# Patient Record
Sex: MALE | Race: WHITE | NOT HISPANIC OR LATINO
[De-identification: names, ages, dates, MRNs, and addresses within clinical notes are randomized per-mention and may not be internally consistent; named-entity substitution may affect disease eponyms.]

---

## 2017-08-08 ENCOUNTER — NON-APPOINTMENT (OUTPATIENT)
Age: 52
End: 2017-08-08

## 2017-08-08 ENCOUNTER — APPOINTMENT (OUTPATIENT)
Dept: ELECTROPHYSIOLOGY | Facility: CLINIC | Age: 52
End: 2017-08-08
Payer: COMMERCIAL

## 2017-08-08 VITALS — DIASTOLIC BLOOD PRESSURE: 79 MMHG | HEART RATE: 57 BPM | SYSTOLIC BLOOD PRESSURE: 121 MMHG | OXYGEN SATURATION: 96 %

## 2017-08-08 DIAGNOSIS — R07.89 OTHER CHEST PAIN: ICD-10-CM

## 2017-08-08 PROCEDURE — 99214 OFFICE O/P EST MOD 30 MIN: CPT

## 2017-08-08 PROCEDURE — 93000 ELECTROCARDIOGRAM COMPLETE: CPT

## 2017-08-15 ENCOUNTER — APPOINTMENT (OUTPATIENT)
Dept: CV DIAGNOSITCS | Facility: HOSPITAL | Age: 52
End: 2017-08-15

## 2017-08-15 ENCOUNTER — OUTPATIENT (OUTPATIENT)
Dept: OUTPATIENT SERVICES | Facility: HOSPITAL | Age: 52
LOS: 1 days | End: 2017-08-15
Payer: COMMERCIAL

## 2017-08-15 DIAGNOSIS — R07.89 OTHER CHEST PAIN: ICD-10-CM

## 2017-08-15 PROCEDURE — 93320 DOPPLER ECHO COMPLETE: CPT | Mod: 26,GC

## 2017-08-15 PROCEDURE — 93325 DOPPLER ECHO COLOR FLOW MAPG: CPT | Mod: 26,GC

## 2017-08-15 PROCEDURE — 93018 CV STRESS TEST I&R ONLY: CPT

## 2017-08-15 PROCEDURE — 93016 CV STRESS TEST SUPVJ ONLY: CPT

## 2017-08-15 PROCEDURE — 93350 STRESS TTE ONLY: CPT | Mod: 26

## 2022-09-06 ENCOUNTER — NON-APPOINTMENT (OUTPATIENT)
Age: 57
End: 2022-09-06

## 2022-09-06 ENCOUNTER — APPOINTMENT (OUTPATIENT)
Dept: ELECTROPHYSIOLOGY | Facility: CLINIC | Age: 57
End: 2022-09-06

## 2022-09-06 VITALS
OXYGEN SATURATION: 97 % | SYSTOLIC BLOOD PRESSURE: 125 MMHG | DIASTOLIC BLOOD PRESSURE: 80 MMHG | HEART RATE: 60 BPM | WEIGHT: 172 LBS | HEIGHT: 70 IN | BODY MASS INDEX: 24.62 KG/M2

## 2022-09-06 DIAGNOSIS — I47.2 VENTRICULAR TACHYCARDIA: ICD-10-CM

## 2022-09-06 PROCEDURE — 93000 ELECTROCARDIOGRAM COMPLETE: CPT

## 2022-09-06 PROCEDURE — 99213 OFFICE O/P EST LOW 20 MIN: CPT | Mod: 25

## 2022-09-06 NOTE — DISCUSSION/SUMMARY
[FreeTextEntry1] : Impression:\par \par 1. Palpitations: EKG performed today to assess for presence of conduction disease and reveals NSR. Brief palpitations. Consider CardioNet to assess for recurrent tachyarrhythmias. Patient declined.  Offered other options: [employer], [employer] Watch, [email or web address]. Patient taught vagal maneuvers, call 911 in the event palpitations do not stop.  \par \par 2. NSVT: Hx of NSVT and near syncope, normal LVEF, normal LHC. EP study non inducible for VT. Off beta blockers given bradycardia at baseline. \par \par Sincerely,\par \par Albert Gibson MD [EKG obtained to assist in diagnosis and management of assessed problem(s)] : EKG obtained to assist in diagnosis and management of assessed problem(s)

## 2022-09-06 NOTE — CARDIOLOGY SUMMARY
[de-identified] : 815/2017: no ischemia  [de-identified] : 8/15/2017: LVEF 65% \par Echocardiographic Study: \par (A) Baseline echocardiogram reveals: \par 1. Normal mitral valve. Minimal mitral regurgitation.\par 2. Normal trileaflet aortic valve. Mild aortic\par regurgitation. \par 3. Normal left ventricular internal dimensions and wall\par thicknesses.\par 4. Normal left ventricular systolic function. No segmental\par wall motion abnormalities.\par 5. Normal right ventricular size and function.\par (B) Stress echocardiogram reveals: \par Normal augmentation in left ventricular systolic function.\par ------------------------------------------------------------------------\par Conclusions: \par 1. Normal hemodynamic response.\par 2. Normal electrocardiographic response.\par 3. Normal augmentation in left ventricular systolic\par function.\par 4. Appropriate heart rate response.\par 5. Appropriate blood pressure response.\par 6. Normal stress echocardiogram with no evidence of\par inducible ischemia. [de-identified] : 3/8/2012: normal coronaries

## 2022-09-06 NOTE — HISTORY OF PRESENT ILLNESS
[FreeTextEntry1] : Yaya Garces is a 56y/o man with Hx of NSVT (EP study 2012, non inducible) who presents today for initial evaluation. Last seen in 2017 and at time had normal ECHO and stress test. Since then, doing well but recently (last week) had an episode of palpitations which lasted up to 30 sec in duration and made him very nervous. Denies anything new that day, was standing waiting for the elevator when it occurred. No other associated symptoms. Denies feeling dizzy or lightheaded. Admits he typically feels occasional extra beats but no sustained symptoms. Typically symptoms when at rest. Not on medications. Denies chest pain,  SOB, syncope or near syncope. Very active, exercising daily. Has a Half [employer] this upcoming weekend. \par

## 2022-12-27 ENCOUNTER — NON-APPOINTMENT (OUTPATIENT)
Age: 57
End: 2022-12-27

## 2022-12-27 ENCOUNTER — APPOINTMENT (OUTPATIENT)
Dept: ELECTROPHYSIOLOGY | Facility: CLINIC | Age: 57
End: 2022-12-27

## 2022-12-27 VITALS
HEART RATE: 70 BPM | BODY MASS INDEX: 25.77 KG/M2 | OXYGEN SATURATION: 99 % | DIASTOLIC BLOOD PRESSURE: 91 MMHG | WEIGHT: 180 LBS | HEIGHT: 70 IN | SYSTOLIC BLOOD PRESSURE: 138 MMHG

## 2022-12-27 PROCEDURE — 93000 ELECTROCARDIOGRAM COMPLETE: CPT

## 2022-12-27 PROCEDURE — 99213 OFFICE O/P EST LOW 20 MIN: CPT | Mod: 25

## 2022-12-27 NOTE — HISTORY OF PRESENT ILLNESS
[FreeTextEntry1] : Yaya Garces is a 58y/o man with Hx of NSVT (EP study 2012, non inducible) who is here today because of recent chest discomfort. Last seen 9/6/22 because of an episode of palpitations which lasted up to 30 sec in duration and made him very nervous. Denied anything new that day, was standing waiting for the elevator when it occurred. No other associated symptoms. Denies feeling dizzy or lightheaded. Admits he typically feels occasional extra beats but no sustained symptoms. Typically symptoms when at rest. Not on medications. Denied chest pain, SOB, syncope or near syncope. Very active, exercising daily. Ran a Half [employer] in September. \par \par Patient had chest discomfort about 3 weeks ago associated with diaphoresis and told his wife weeks later and patient returned here for f/u.  Chest discomfort was pressure about 6/10, but he was able to fall asleep and in the morning the discomfort was gone.  He has no other associated symptoms He has not had dyspnea, palpitations, near syncope or syncope.  The episode of chest discomfort was after drinking excess alcohol.  He has been biking and jogging since the event, up to 5 miles without symptoms.

## 2022-12-27 NOTE — DISCUSSION/SUMMARY
[EKG obtained to assist in diagnosis and management of assessed problem(s)] : EKG obtained to assist in diagnosis and management of assessed problem(s) [FreeTextEntry1] : 1] chest discomfort: ECG unchanged.  Recommend stress echocardiogram since no work up since 2017,  blood work by PCP reportedly normal. Patient advised ER with that type of chest discomfort in the future.  He should also curtail amount of alcohol he drinks.

## 2023-01-03 ENCOUNTER — APPOINTMENT (OUTPATIENT)
Dept: CV DIAGNOSITCS | Facility: HOSPITAL | Age: 58
End: 2023-01-03

## 2023-01-03 ENCOUNTER — OUTPATIENT (OUTPATIENT)
Dept: OUTPATIENT SERVICES | Facility: HOSPITAL | Age: 58
LOS: 1 days | End: 2023-01-03
Payer: COMMERCIAL

## 2023-01-03 DIAGNOSIS — J34.9 UNSPECIFIED DISORDER OF NOSE AND NASAL SINUSES: ICD-10-CM

## 2023-01-03 DIAGNOSIS — R07.89 OTHER CHEST PAIN: ICD-10-CM

## 2023-01-03 PROCEDURE — 93320 DOPPLER ECHO COMPLETE: CPT | Mod: 26,GC

## 2023-01-03 PROCEDURE — 93325 DOPPLER ECHO COLOR FLOW MAPG: CPT | Mod: 26,GC

## 2023-01-03 PROCEDURE — 93350 STRESS TTE ONLY: CPT | Mod: 26

## 2023-12-01 ENCOUNTER — NON-APPOINTMENT (OUTPATIENT)
Age: 58
End: 2023-12-01

## 2023-12-01 ENCOUNTER — APPOINTMENT (OUTPATIENT)
Dept: ELECTROPHYSIOLOGY | Facility: CLINIC | Age: 58
End: 2023-12-01
Payer: COMMERCIAL

## 2023-12-01 VITALS
SYSTOLIC BLOOD PRESSURE: 129 MMHG | HEIGHT: 70 IN | HEART RATE: 59 BPM | BODY MASS INDEX: 26.05 KG/M2 | DIASTOLIC BLOOD PRESSURE: 79 MMHG | OXYGEN SATURATION: 98 % | WEIGHT: 182 LBS

## 2023-12-01 VITALS — TEMPERATURE: 98.2 F

## 2023-12-01 PROCEDURE — 99213 OFFICE O/P EST LOW 20 MIN: CPT | Mod: 25

## 2023-12-01 PROCEDURE — 93000 ELECTROCARDIOGRAM COMPLETE: CPT

## 2024-01-05 ENCOUNTER — NON-APPOINTMENT (OUTPATIENT)
Age: 59
End: 2024-01-05

## 2024-01-05 DIAGNOSIS — R00.2 PALPITATIONS: ICD-10-CM

## 2024-11-08 ENCOUNTER — APPOINTMENT (OUTPATIENT)
Dept: ELECTROPHYSIOLOGY | Facility: CLINIC | Age: 59
End: 2024-11-08
Payer: COMMERCIAL

## 2024-11-08 ENCOUNTER — NON-APPOINTMENT (OUTPATIENT)
Age: 59
End: 2024-11-08

## 2024-11-08 VITALS
SYSTOLIC BLOOD PRESSURE: 152 MMHG | DIASTOLIC BLOOD PRESSURE: 91 MMHG | OXYGEN SATURATION: 96 % | WEIGHT: 185 LBS | HEART RATE: 70 BPM | BODY MASS INDEX: 26.48 KG/M2 | HEIGHT: 70 IN

## 2024-11-08 DIAGNOSIS — I47.29 OTHER VENTRICULAR TACHYCARDIA: ICD-10-CM

## 2024-11-08 DIAGNOSIS — I47.10 SUPRAVENTRICULAR TACHYCARDIA, UNSPECIFIED: ICD-10-CM

## 2024-11-08 PROCEDURE — 93000 ELECTROCARDIOGRAM COMPLETE: CPT

## 2024-11-08 PROCEDURE — 99213 OFFICE O/P EST LOW 20 MIN: CPT | Mod: 25

## 2024-12-18 ENCOUNTER — OUTPATIENT (OUTPATIENT)
Dept: OUTPATIENT SERVICES | Facility: HOSPITAL | Age: 59
LOS: 1 days | End: 2024-12-18
Payer: COMMERCIAL

## 2024-12-18 ENCOUNTER — APPOINTMENT (OUTPATIENT)
Dept: CV DIAGNOSITCS | Facility: HOSPITAL | Age: 59
End: 2024-12-18

## 2024-12-18 ENCOUNTER — RESULT REVIEW (OUTPATIENT)
Age: 59
End: 2024-12-18

## 2024-12-18 DIAGNOSIS — I47.29 OTHER VENTRICULAR TACHYCARDIA: ICD-10-CM

## 2024-12-18 PROCEDURE — 93306 TTE W/DOPPLER COMPLETE: CPT | Mod: 26

## 2025-01-07 DIAGNOSIS — R07.9 CHEST PAIN, UNSPECIFIED: ICD-10-CM

## 2025-04-25 ENCOUNTER — APPOINTMENT (OUTPATIENT)
Dept: CV DIAGNOSTICS | Facility: HOSPITAL | Age: 60
End: 2025-04-25

## 2025-04-25 ENCOUNTER — RESULT REVIEW (OUTPATIENT)
Age: 60
End: 2025-04-25

## 2025-04-25 ENCOUNTER — OUTPATIENT (OUTPATIENT)
Dept: OUTPATIENT SERVICES | Facility: HOSPITAL | Age: 60
LOS: 1 days | End: 2025-04-25
Payer: COMMERCIAL

## 2025-04-25 DIAGNOSIS — R07.89 OTHER CHEST PAIN: ICD-10-CM

## 2025-04-25 DIAGNOSIS — I47.29 OTHER VENTRICULAR TACHYCARDIA: ICD-10-CM

## 2025-04-25 PROCEDURE — 93016 CV STRESS TEST SUPVJ ONLY: CPT | Mod: GC

## 2025-04-25 PROCEDURE — 93018 CV STRESS TEST I&R ONLY: CPT | Mod: GC

## 2025-04-27 PROBLEM — J45.909 ACTIVE ASTHMA: Status: ACTIVE | Noted: 2025-04-27
